# Patient Record
Sex: FEMALE | Race: WHITE | NOT HISPANIC OR LATINO | Employment: FULL TIME | URBAN - METROPOLITAN AREA
[De-identification: names, ages, dates, MRNs, and addresses within clinical notes are randomized per-mention and may not be internally consistent; named-entity substitution may affect disease eponyms.]

---

## 2017-04-11 ENCOUNTER — ALLSCRIPTS OFFICE VISIT (OUTPATIENT)
Dept: OTHER | Facility: OTHER | Age: 32
End: 2017-04-11

## 2017-04-11 DIAGNOSIS — J30.9 ALLERGIC RHINITIS: ICD-10-CM

## 2017-04-11 DIAGNOSIS — L30.9 DERMATITIS: ICD-10-CM

## 2017-04-12 ENCOUNTER — LAB CONVERSION - ENCOUNTER (OUTPATIENT)
Dept: OTHER | Facility: OTHER | Age: 32
End: 2017-04-12

## 2017-04-12 ENCOUNTER — GENERIC CONVERSION - ENCOUNTER (OUTPATIENT)
Dept: OTHER | Facility: OTHER | Age: 32
End: 2017-04-12

## 2017-04-12 LAB
ALLERGEN ALMONDS (HISTORICAL): <0.1 KU/L
ALLERGEN CASHEW (HISTORICAL): <0.1 KU/L
ALLERGEN CAT EPITHELIUM-DANDER (HISTORICAL): <0.1 KU/L
ALLERGEN HAZELNUT/FILBERT IGE (HISTORICAL): <0.1 KU/L
ALLERGEN LAMB'S QUARTER IGE (HISTORICAL): <0.1 KU/L
ALLERGEN SCALLOP (F338) IGE (HISTORICAL): <0.1 KU/L
ALLERGEN TUNA (F40) IGE (HISTORICAL): <0.1 KU/L
ALLERGEN, OAK (HISTORICAL): <0.1 KU/L
ALTERNARIA ALTERNATA (HISTORICAL): <0.1 KU/L
CLADOSPORIUM HERBARUM (HISTORICAL): <0.1 KU/L
CLASS (HISTORICAL): 0
COMMON RAGWEED (HISTORICAL): <0.1 KU/L
D. FARINAE (HISTORICAL): <0.1 KU/L
DOG DANDER (HISTORICAL): <0.1 KU/L
EGG WHITE (HISTORICAL): <0.1 KU/L
F041 SALMON (HISTORICAL): <0.1 KU/L
FISH COD (HISTORICAL): <0.1 KU/L
KENTUCKY BLUE GRASS (HISTORICAL): <0.1 KU/L
MILK, COW'S (HISTORICAL): <0.1 KU/L
PEANUT (HISTORICAL): <0.1 KU/L
SESAME SEED IGE (HISTORICAL): <0.1 KU/L
SHRIMP (HISTORICAL): <0.1 KU/L
SOYBEAN (HISTORICAL): <0.1 KU/L
TIMOTHY GRASS (HISTORICAL): <0.1 KU/L
WALNUT (HISTORICAL): <0.1 KU/L
WHEAT (HISTORICAL): <0.1 KU/L

## 2018-01-14 VITALS
TEMPERATURE: 97.8 F | WEIGHT: 127 LBS | RESPIRATION RATE: 18 BRPM | SYSTOLIC BLOOD PRESSURE: 110 MMHG | HEIGHT: 66 IN | BODY MASS INDEX: 20.41 KG/M2 | HEART RATE: 64 BPM | DIASTOLIC BLOOD PRESSURE: 80 MMHG

## 2018-01-17 NOTE — RESULT NOTES
Message  Michelle Manzanares enclosed a copy of your recent labs  Extensive allergy testing was all normal this included both inhalant allergens and food allergies   As per our conversation at your last visit you could consider a punch biopsy for persistent rash      Verified Results  (Q) ALLERGY EVALUATION 1, [facility] 11Apr2017 10:15AM Marlan Liter     Test Name Result Flag Reference   ALTERNARIA ALTERNATA (M6)$IGE <0 10 kU/L     CLASS 0     CAT DANDER (E1) IGE <0 10 kU/L     CLASS 0     CLADOSPORIUM HERBARUM$(M2) IGE <0 10 kU/L     CLASS 0     COMMON RAGWEED (SHORT)$(W1) IGE <0 10 kU/L     CLASS 0     DERMATOPHAGOIDES FARINAE$(D2) IGE <0 10 kU/L     CLASS 0     DOG DANDER (E5) IGE <0 10 kU/L     CLASS 0     VIDYA GRASS([place] BLUE)$(G8) IGE <0 10 kU/L     CLASS 0     LAMB'S QUARTERS (GOOSE$FOOT) (W10) IGE <0 10 kU/L     CLASS 0     OAK (T7) IGE <0 10 kU/L     CLASS 0     DEANN GRASS (G6) IGE <0 10 kU/L     CLASS 0       *(Q) FOOD ALLERGY PROFILE 11Apr2017 10:15AM Marlan Liter     Test Name Result Flag Reference   EGG WHITE (F1) IGE <0 10 kU/L     CLASS 0     PEANUT (F13) IGE <0 10 kU/L     CLASS 0     WHEAT (F4) IGE <0 10 kU/L     CLASS 0     WALNUT (F256) IGE <0 10 kU/L     CLASS 0     CODFISH (F3) IGE <0 10 kU/L     CLASS 0     MILK (F2) IGE <0 10 kU/L     CLASS 0     SOYBEAN (F14) IGE <0 10 kU/L     CLASS 0     SHRIMP (F24) IGE <0 10 kU/L     CLASS 0     SCALLOP (F338) IGE <0 10 kU/L     CLASS 0     SESAME SEED (F10) IGE <0 10 kU/L     CLASS 0     HAZELNUT (F17) IGE <0 10 kU/L     CLASS 0     CASHEW NUT (F202) IGE <0 10 kU/L     CLASS 0     ALMOND (F20) IGE <0 10 kU/L     CLASS 0     SALMON (F41) IGE <0 10 kU/L     CLASS 0     TUNA (F40) IGE <0 10 kU/L     CLASS 0       INTERPRETATION 11Apr2017 10:15AM Marlan Liter   REPORT COMMENT:  FASTING:NO     Test Name Result Flag Reference   INTERPRETATION See Below     Specific                        Level of Allergen  IGE Class      kU/L             Specific IGE Antibody -----         ---------        -------------------    0              <0 10           Absent/Undetectable    0/1        0 10-0 34           Very Low Level    1          0 35-0 69           Low Level    2          0 70-3 49           Moderate Level    3          3 50-17 4           High Level    4          17 5-49 9           Very High Level    5                        Very High Level    6              >100            Very High Level     The clinical relevance of allergen results of  0 10-0 34 kU/L are undetermined and intended for   specialist use  Allergens denoted with a '**' include results using  one or more analyte specific reagents  In those  cases, the test was developed and its analytical  performance characteristics have been determined by  Yani Robertson  It has not been cleared or approved  by the [place]  Food and Drug Administration  This assay   has been validated pursuant to the CLIA regulations   and is used for clinical purposes         Results/Data     (Q) ALLERGY EVALUATION 1[facility]   ALTERNARIA ALTERNATA (M6)$IGE: <0 10 kU/L  CLASS: 0   CAT DANDER (E1) IGE: <0 10 kU/L  CLASS: 0   CLADOSPORIUM HERBARUM$(M2) IGE: <0 10 kU/L  CLASS: 0   COMMON RAGWEED (SHORT)$(W1) IGE: <0 10 kU/L  CLASS: 0   DERMATOPHAGOIDES FARINAE$(D2) IGE: <0 10 kU/L  CLASS: 0   DOG DANDER (E5) IGE: <0 10 kU/L  CLASS: 0   VIDYA GRASS([place] BLUE)$(G8) IGE: <0 10 kU/L  CLASS: 0   LAMB'S QUARTERS (GOOSE$FOOT) (W10) IGE: <0 10 kU/L  CLASS: 0   OAK (T7) IGE: <0 10 kU/L  CLASS: 0   DEANN GRASS (G6) IGE: <0 10 kU/L  CLASS: 0   *(Q) FOOD ALLERGY PROFILE   EGG WHITE (F1) IGE: <0 10 kU/L  CLASS: 0   PEANUT (F13) IGE: <0 10 kU/L  CLASS: 0   WHEAT (F4) IGE: <0 10 kU/L  CLASS: 0   WALNUT (F256) IGE: <0 10 kU/L  CLASS: 0   CODFISH (F3) IGE: <0 10 kU/L  CLASS: 0   MILK (F2) IGE: <0 10 kU/L  CLASS: 0   SOYBEAN (F14) IGE: <0 10 kU/L  CLASS: 0   SHRIMP (F24) IGE: <0 10 kU/L  CLASS: 0   SCALLOP (F338) IGE: <0 10 kU/L  CLASS: 0 SESAME SEED (F10) IGE: <0 10 kU/L  CLASS: 0   HAZELNUT (F17) IGE: <0 10 kU/L  CLASS: 0   CASHEW NUT (F202) IGE: <0 10 kU/L  CLASS: 0   ALMOND (F20) IGE: <0 10 kU/L  CLASS: 0   SALMON (F41) IGE: <0 10 kU/L  CLASS: 0   TUNA (F40) IGE: <0 10 kU/L  CLASS: 0     Signatures   Electronically signed by : JANY Lowe ; Apr 12 2017  6:02PM EST                       (Author)

## 2019-01-07 ENCOUNTER — OFFICE VISIT (OUTPATIENT)
Dept: FAMILY MEDICINE CLINIC | Facility: CLINIC | Age: 34
End: 2019-01-07
Payer: COMMERCIAL

## 2019-01-07 VITALS
BODY MASS INDEX: 22.5 KG/M2 | HEART RATE: 68 BPM | HEIGHT: 66 IN | TEMPERATURE: 98.5 F | WEIGHT: 140 LBS | DIASTOLIC BLOOD PRESSURE: 78 MMHG | SYSTOLIC BLOOD PRESSURE: 108 MMHG

## 2019-01-07 DIAGNOSIS — J01.00 ACUTE NON-RECURRENT MAXILLARY SINUSITIS: Primary | ICD-10-CM

## 2019-01-07 PROCEDURE — 99213 OFFICE O/P EST LOW 20 MIN: CPT | Performed by: NURSE PRACTITIONER

## 2019-01-07 PROCEDURE — 3008F BODY MASS INDEX DOCD: CPT | Performed by: NURSE PRACTITIONER

## 2019-01-07 PROCEDURE — 1036F TOBACCO NON-USER: CPT | Performed by: NURSE PRACTITIONER

## 2019-01-07 RX ORDER — AMOXICILLIN AND CLAVULANATE POTASSIUM 875; 125 MG/1; MG/1
1 TABLET, FILM COATED ORAL EVERY 12 HOURS SCHEDULED
Qty: 20 TABLET | Refills: 0 | Status: SHIPPED | OUTPATIENT
Start: 2019-01-07 | End: 2019-01-17

## 2019-01-07 RX ORDER — GUAIFENESIN 600 MG
1200 TABLET, EXTENDED RELEASE 12 HR ORAL EVERY 12 HOURS SCHEDULED
Refills: 0
Start: 2019-01-07 | End: 2019-06-13 | Stop reason: ALTCHOICE

## 2019-01-07 NOTE — LETTER
January 7, 2019     Patient: Fortino Amor   YOB: 1985   Date of Visit: 1/7/2019       To Whom it May Concern:    Chey Jack was seen in my clinic on 1/7/2019  She may return to work on 01/10/19  If you have any questions or concerns, please don't hesitate to call           Sincerely,          JOSHUA Arenas        CC: No Recipients

## 2019-01-07 NOTE — PROGRESS NOTES
Assessment/Plan:     Diagnoses and all orders for this visit:    Acute non-recurrent maxillary sinusitis  -     amoxicillin-clavulanate (AUGMENTIN) 875-125 mg per tablet; Take 1 tablet by mouth every 12 (twelve) hours for 10 days  -     guaiFENesin (MUCINEX) 600 mg 12 hr tablet; Take 2 tablets (1,200 mg total) by mouth every 12 (twelve) hours    Discussed with patient plan to treat with 10 day course of Augmentin twice a day  Recommended patient use OTC Mucinex for nasal congestion   Patient instructed to call if no improvement in 72 hours or symptoms worsen    Subjective:      Patient ID: Pooja Chadwick is a 28 y o  female  28year old female presenting with nasal congestion for past week and developed increasing laryngitis over the past three days  She went to an urgent care center and was told her illness was viral and conservative measures suggested  She denies fever, chills or generalized body aches being associated with other symptoms  She used acetaminophen, ibuprofen, and OTC Cold and Flu Medication  She reports that the urgent care center prescribed promethazine DM for congestion but it caused her to become dizzy  No family history on file  Social History     Social History    Marital status: /Civil Union     Spouse name: N/A    Number of children: N/A    Years of education: N/A     Occupational History    Not on file  Social History Main Topics    Smoking status: Former Smoker    Smokeless tobacco: Never Used    Alcohol use Not on file    Drug use: Unknown    Sexual activity: Not on file     Other Topics Concern    Not on file     Social History Narrative    No narrative on file     No past medical history on file  No past surgical history on file    Allergies   Allergen Reactions    Pollen Extract        Current Outpatient Prescriptions:     amoxicillin-clavulanate (AUGMENTIN) 875-125 mg per tablet, Take 1 tablet by mouth every 12 (twelve) hours for 10 days, Disp: 20 tablet, Rfl: 0    guaiFENesin (MUCINEX) 600 mg 12 hr tablet, Take 2 tablets (1,200 mg total) by mouth every 12 (twelve) hours, Disp: , Rfl: 0      Review of Systems   Constitutional: Negative  HENT: Positive for congestion, postnasal drip, rhinorrhea, sore throat and voice change  Eyes: Negative  Respiratory: Positive for cough  Cardiovascular: Negative  Gastrointestinal: Negative  Musculoskeletal: Negative  Neurological: Negative  Hematological: Negative  Psychiatric/Behavioral: Negative  Objective:    /78 (BP Location: Left arm, Patient Position: Sitting, Cuff Size: Standard)   Pulse 68   Temp 98 5 °F (36 9 °C)   Ht 5' 6" (1 676 m)   Wt 63 5 kg (140 lb)   BMI 22 60 kg/m² (Reviewed)     Physical Exam   Constitutional: She is oriented to person, place, and time  Vital signs are normal  She appears well-developed and well-nourished  HENT:   Head: Normocephalic and atraumatic  Right Ear: Tympanic membrane, external ear and ear canal normal    Left Ear: Tympanic membrane, external ear and ear canal normal    Nose: Mucosal edema and rhinorrhea present  Right sinus exhibits maxillary sinus tenderness  Left sinus exhibits maxillary sinus tenderness  Mouth/Throat: Uvula is midline and mucous membranes are normal  Oropharyngeal exudate and posterior oropharyngeal erythema present  Positive increased sinus pressure with forward head tilt   Eyes: Pupils are equal, round, and reactive to light  Conjunctivae, EOM and lids are normal    Neck: Trachea normal and normal range of motion  Cardiovascular: Normal rate, regular rhythm and normal heart sounds  Pulmonary/Chest: Effort normal and breath sounds normal    Lymphadenopathy:     She has no cervical adenopathy  Neurological: She is alert and oriented to person, place, and time  Skin: Skin is warm and dry  Psychiatric: She has a normal mood and affect   Her behavior is normal

## 2019-01-08 ENCOUNTER — TELEPHONE (OUTPATIENT)
Dept: FAMILY MEDICINE CLINIC | Facility: CLINIC | Age: 34
End: 2019-01-08

## 2019-01-08 DIAGNOSIS — J01.00 ACUTE NON-RECURRENT MAXILLARY SINUSITIS: Primary | ICD-10-CM

## 2019-01-08 RX ORDER — AZITHROMYCIN 250 MG/1
TABLET, FILM COATED ORAL
Qty: 6 TABLET | Refills: 0 | Status: SHIPPED | OUTPATIENT
Start: 2019-01-08 | End: 2019-01-12

## 2019-01-08 NOTE — TELEPHONE ENCOUNTER
Pt was seen yesterday for sinusitis and was prescribed amoxicillin but the tablets are too big for her to swallow because her throat is very sore  She is asking to be switched to a zpak because she has had them before and knows the pills are smaller   Can leave message on pts phone

## 2019-06-13 ENCOUNTER — OFFICE VISIT (OUTPATIENT)
Dept: FAMILY MEDICINE CLINIC | Facility: CLINIC | Age: 34
End: 2019-06-13
Payer: COMMERCIAL

## 2019-06-13 VITALS
HEART RATE: 118 BPM | DIASTOLIC BLOOD PRESSURE: 64 MMHG | HEIGHT: 66 IN | OXYGEN SATURATION: 98 % | TEMPERATURE: 98.2 F | SYSTOLIC BLOOD PRESSURE: 88 MMHG | BODY MASS INDEX: 21.13 KG/M2 | RESPIRATION RATE: 17 BRPM | WEIGHT: 131.5 LBS

## 2019-06-13 DIAGNOSIS — J00 ACUTE NASOPHARYNGITIS: Primary | ICD-10-CM

## 2019-06-13 DIAGNOSIS — J35.8 TONSILLITH: ICD-10-CM

## 2019-06-13 PROBLEM — J30.9 ALLERGIC RHINITIS: Status: ACTIVE | Noted: 2017-04-11

## 2019-06-13 PROBLEM — L30.9 ECZEMA: Status: ACTIVE | Noted: 2017-04-11

## 2019-06-13 PROCEDURE — 1036F TOBACCO NON-USER: CPT | Performed by: PHYSICIAN ASSISTANT

## 2019-06-13 PROCEDURE — 3008F BODY MASS INDEX DOCD: CPT | Performed by: PHYSICIAN ASSISTANT

## 2019-06-13 PROCEDURE — 99213 OFFICE O/P EST LOW 20 MIN: CPT | Performed by: PHYSICIAN ASSISTANT

## 2019-11-14 ENCOUNTER — OFFICE VISIT (OUTPATIENT)
Dept: FAMILY MEDICINE CLINIC | Facility: CLINIC | Age: 34
End: 2019-11-14
Payer: COMMERCIAL

## 2019-11-14 VITALS
RESPIRATION RATE: 16 BRPM | SYSTOLIC BLOOD PRESSURE: 110 MMHG | HEIGHT: 66 IN | OXYGEN SATURATION: 98 % | HEART RATE: 96 BPM | DIASTOLIC BLOOD PRESSURE: 64 MMHG | WEIGHT: 136 LBS | TEMPERATURE: 98 F | BODY MASS INDEX: 21.86 KG/M2

## 2019-11-14 DIAGNOSIS — J06.9 ACUTE URI: Primary | ICD-10-CM

## 2019-11-14 PROCEDURE — 99213 OFFICE O/P EST LOW 20 MIN: CPT | Performed by: FAMILY MEDICINE

## 2019-11-14 PROCEDURE — 1036F TOBACCO NON-USER: CPT | Performed by: FAMILY MEDICINE

## 2019-11-14 RX ORDER — LEVOTHYROXINE SODIUM 0.03 MG/1
25 TABLET ORAL DAILY
Refills: 1 | COMMUNITY
Start: 2019-10-23

## 2019-11-14 NOTE — PROGRESS NOTES
Assessment/Plan:     Diagnoses and all orders for this visit:    Acute URI    Other orders  -     levothyroxine 25 mcg tablet; Take 25 mcg by mouth daily  -     Prenatal Multivit-Min-Fe-FA (PRE- FORMULA) TABS; Take 1 tablet by mouth daily        Continue with prn Tylenol and Benadryl  Add Flonase nasal spray daily with saline nasal spray  Stay hydrated  Advised to call if any changes  I recommended a flu vaccine at a later date  Patient ID: Brad Schofield is a 27 y o  female  2 week history of persistent cold symptoms  + nasal congestion with post nasal drainage  Intermittent cough at times productive of clear to green phlegm  13 weeks pregnant  OB suggested prn Tylenol and Benadryl  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  No flu vaccine to date  The following portions of the patient's history were reviewed and updated as appropriate: allergies, current medications, past family history, past medical history, past social history, past surgical history and problem list     Review of Systems   Constitutional: Positive for fatigue  Negative for appetite change, chills and fever  HENT: Positive for congestion, postnasal drip, rhinorrhea and voice change (hoarse )  Negative for ear pain, sinus pain and sore throat  See HPI   Respiratory: Negative for shortness of breath and wheezing  See HPI   Cardiovascular: Negative for chest pain  Gastrointestinal: Negative for diarrhea, nausea and vomiting  Musculoskeletal: Negative for myalgias  Neurological: Negative for headaches  Hematological: Negative for adenopathy  Objective:      /64   Pulse 96   Temp 98 °F (36 7 °C)   Resp 16   Ht 5' 6" (1 676 m)   Wt 61 7 kg (136 lb)   LMP 2019   SpO2 98%   BMI 21 95 kg/m²          Physical Exam   Constitutional: She appears well-developed and well-nourished  No distress     HENT:   Right Ear: Tympanic membrane normal    Left Ear: Tympanic membrane normal    Nose: Right sinus exhibits no maxillary sinus tenderness and no frontal sinus tenderness  Left sinus exhibits no maxillary sinus tenderness and no frontal sinus tenderness  Mouth/Throat: Uvula is midline, oropharynx is clear and moist and mucous membranes are normal  No oral lesions  No posterior oropharyngeal erythema  Eyes: Conjunctivae are normal  No scleral icterus  Neck: No tracheal deviation present  No thyromegaly present  Cardiovascular: Normal rate, regular rhythm and normal heart sounds  Exam reveals no gallop  No murmur heard  Pulmonary/Chest: Effort normal and breath sounds normal  No respiratory distress  She has no wheezes  She has no rales  Lymphadenopathy:     She has no cervical adenopathy  Skin: No rash noted  Nursing note and vitals reviewed

## 2019-11-19 ENCOUNTER — TELEPHONE (OUTPATIENT)
Dept: FAMILY MEDICINE CLINIC | Facility: CLINIC | Age: 34
End: 2019-11-19

## 2019-11-19 DIAGNOSIS — J06.9 ACUTE URI: Primary | ICD-10-CM

## 2019-11-19 RX ORDER — AZITHROMYCIN 250 MG/1
TABLET, FILM COATED ORAL
Qty: 6 TABLET | Refills: 0 | Status: SHIPPED | OUTPATIENT
Start: 2019-11-19 | End: 2019-11-23

## 2019-11-19 NOTE — TELEPHONE ENCOUNTER
Patient was seen on 11/14/19 for Acute URI  She has not gotten any better  You had discussed an antibiotic but she is going into second trimester of her pregnancy  Please advise  [facility]      Leonardo Doan [phone]

## 2019-11-19 NOTE — TELEPHONE ENCOUNTER
Spoke with Dr Silver Brine  He is aware of her second trimester of pregnancy & feels the zpack is the best choice and it is a shorter dose of medication  Patient notified

## 2019-11-19 NOTE — TELEPHONE ENCOUNTER
Patient called and I gave message  She is asking if Penicillin would be a better option then the [address]  for her since she is pregnant? She is just concerned  Please advise

## 2020-01-24 ENCOUNTER — OFFICE VISIT (OUTPATIENT)
Dept: FAMILY MEDICINE CLINIC | Facility: CLINIC | Age: 35
End: 2020-01-24
Payer: COMMERCIAL

## 2020-01-24 VITALS
BODY MASS INDEX: 23.57 KG/M2 | DIASTOLIC BLOOD PRESSURE: 64 MMHG | SYSTOLIC BLOOD PRESSURE: 96 MMHG | WEIGHT: 146 LBS | RESPIRATION RATE: 16 BRPM | OXYGEN SATURATION: 99 % | HEART RATE: 95 BPM | TEMPERATURE: 96.5 F

## 2020-01-24 DIAGNOSIS — E03.9 HYPOTHYROID IN PREGNANCY, ANTEPARTUM: ICD-10-CM

## 2020-01-24 DIAGNOSIS — O99.280 HYPOTHYROID IN PREGNANCY, ANTEPARTUM: ICD-10-CM

## 2020-01-24 DIAGNOSIS — Z3A.21 21 WEEKS GESTATION OF PREGNANCY: ICD-10-CM

## 2020-01-24 DIAGNOSIS — G93.3 POST-INFLUENZA SYNDROME: Primary | ICD-10-CM

## 2020-01-24 PROBLEM — N92.6 IRREGULAR PERIODS/MENSTRUAL CYCLES: Status: ACTIVE | Noted: 2018-07-06

## 2020-01-24 PROBLEM — Z98.891 HISTORY OF CESAREAN DELIVERY: Status: ACTIVE | Noted: 2019-10-30

## 2020-01-24 PROBLEM — Z87.51 HISTORY OF PRETERM DELIVERY: Status: ACTIVE | Noted: 2019-10-30

## 2020-01-24 PROCEDURE — 1036F TOBACCO NON-USER: CPT | Performed by: PHYSICIAN ASSISTANT

## 2020-01-24 PROCEDURE — 99213 OFFICE O/P EST LOW 20 MIN: CPT | Performed by: PHYSICIAN ASSISTANT

## 2020-01-24 NOTE — PROGRESS NOTES
Assessment/Plan:     Diagnoses and all orders for this visit:    Post-influenza syndrome  21 weeks gestation of pregnancy  Lungs clear, Afebrile  Discussed prolonged recovery complicated by pregnancy  She was seen by Óscar Khalil yesterday for 21w followup with no concern  No evidence of current infection  Encouraged patient to continue with fluid intake  - Discussed use of Tylenol during pregnancy  Discussed new laboratory findings regarding possible connection of Tylenol with ADHD development   - Directed to FU with worsening symptoms or fever    Hypothyroidism in pregnancy, antepartum  Managed on Levothyroxine 25 mcg  Managed by Gyn        Subjective:    Patient ID: Orestes Del Castillo is a 27 y o  female  Pt is presenting today for lingering dry cough, PND, nasal congestion and lethargy for the past week  7 days ago she was seen at Urgent Care and tested positive for influenza B strain  She was too late for Tamiflu but was put on ZPak  She is 56S1D uncomplicated pregnant  She was diagnosed with hypothyroidism in pregnancy and started on levothyroxine  She has significantly improved over the past week  She wanted to discuss recent study associating Tylenol during pregnancy and development of ADHD / autism  The following portions of the patient's history were reviewed and updated as appropriate: allergies, current medications and problem list     Review of Systems   Constitutional: Positive for fatigue  Negative for fever and unexpected weight change  HENT: Positive for rhinorrhea  Negative for sore throat  Respiratory: Positive for cough  Negative for shortness of breath and wheezing  Cardiovascular: Negative for chest pain, palpitations and leg swelling  Gastrointestinal: Negative for constipation, diarrhea and nausea  Musculoskeletal: Negative for arthralgias and myalgias           Objective:  BP 96/64   Pulse 95   Temp (!) 96 5 °F (35 8 °C)   Resp 16   Wt 66 2 kg (146 lb)   LMP 05/13/2019   SpO2 99%   BMI 23 57 kg/m²      Physical Exam   Constitutional: She is oriented to person, place, and time  She appears well-developed and well-nourished  No distress  HENT:   Head: Normocephalic and atraumatic  Right Ear: Tympanic membrane, external ear and ear canal normal    Left Ear: Tympanic membrane, external ear and ear canal normal    Mouth/Throat: Oropharynx is clear and moist  No oropharyngeal exudate or posterior oropharyngeal erythema  Eyes: Pupils are equal, round, and reactive to light  Neck: Normal range of motion  Neck supple  Cardiovascular: Normal rate, regular rhythm, normal heart sounds and intact distal pulses  Exam reveals no gallop and no friction rub  No murmur heard  Pulmonary/Chest: Effort normal and breath sounds normal  No respiratory distress  She has no wheezes  She has no rales  Lymphadenopathy:     She has no cervical adenopathy  Neurological: She is alert and oriented to person, place, and time  Skin: She is not diaphoretic  Psychiatric: She has a normal mood and affect  Her behavior is normal  Thought content normal    Vitals reviewed

## 2020-02-06 ENCOUNTER — TELEPHONE (OUTPATIENT)
Dept: FAMILY MEDICINE CLINIC | Facility: CLINIC | Age: 35
End: 2020-02-06

## 2020-02-06 NOTE — TELEPHONE ENCOUNTER
Spoke with patient  Her employer has a policy in place where if a person misses more than 5 days of work, they need to file for disability  Patient had the flu which started on 1/16/2020  On 1/18/2020 she went to urgent care  After still not feeling better, she came to our office on 1/24/2020  She missed work from the 16th through the 24th

## 2021-11-05 ENCOUNTER — TELEPHONE (OUTPATIENT)
Dept: FAMILY MEDICINE CLINIC | Facility: CLINIC | Age: 36
End: 2021-11-05

## 2021-11-09 ENCOUNTER — TELEMEDICINE (OUTPATIENT)
Dept: FAMILY MEDICINE CLINIC | Facility: CLINIC | Age: 36
End: 2021-11-09

## 2021-11-09 ENCOUNTER — TELEPHONE (OUTPATIENT)
Dept: FAMILY MEDICINE CLINIC | Facility: CLINIC | Age: 36
End: 2021-11-09

## 2021-11-09 DIAGNOSIS — B34.9 VIRAL INFECTION, UNSPECIFIED: Primary | ICD-10-CM

## 2021-11-09 PROCEDURE — 99442 PR PHYS/QHP TELEPHONE EVALUATION 11-20 MIN: CPT | Performed by: PHYSICIAN ASSISTANT

## 2021-11-09 PROCEDURE — U0003 INFECTIOUS AGENT DETECTION BY NUCLEIC ACID (DNA OR RNA); SEVERE ACUTE RESPIRATORY SYNDROME CORONAVIRUS 2 (SARS-COV-2) (CORONAVIRUS DISEASE [COVID-19]), AMPLIFIED PROBE TECHNIQUE, MAKING USE OF HIGH THROUGHPUT TECHNOLOGIES AS DESCRIBED BY CMS-2020-01-R: HCPCS | Performed by: PHYSICIAN ASSISTANT

## 2021-11-09 PROCEDURE — U0005 INFEC AGEN DETEC AMPLI PROBE: HCPCS | Performed by: PHYSICIAN ASSISTANT

## 2021-11-09 RX ORDER — BENZONATATE 200 MG/1
200 CAPSULE ORAL 3 TIMES DAILY PRN
Qty: 20 CAPSULE | Refills: 0 | Status: SHIPPED | OUTPATIENT
Start: 2021-11-09 | End: 2022-08-03 | Stop reason: SDUPTHER

## 2021-11-10 ENCOUNTER — PATIENT MESSAGE (OUTPATIENT)
Dept: FAMILY MEDICINE CLINIC | Facility: CLINIC | Age: 36
End: 2021-11-10

## 2021-11-10 ENCOUNTER — TELEPHONE (OUTPATIENT)
Dept: FAMILY MEDICINE CLINIC | Facility: CLINIC | Age: 36
End: 2021-11-10

## 2021-11-10 LAB — SARS-COV-2 RNA RESP QL NAA+PROBE: NEGATIVE

## 2022-08-03 ENCOUNTER — TELEPHONE (OUTPATIENT)
Dept: FAMILY MEDICINE CLINIC | Facility: CLINIC | Age: 37
End: 2022-08-03

## 2022-08-03 ENCOUNTER — TELEMEDICINE (OUTPATIENT)
Dept: FAMILY MEDICINE CLINIC | Facility: CLINIC | Age: 37
End: 2022-08-03
Payer: COMMERCIAL

## 2022-08-03 DIAGNOSIS — U07.1 COVID-19: Primary | ICD-10-CM

## 2022-08-03 DIAGNOSIS — B34.9 VIRAL INFECTION, UNSPECIFIED: ICD-10-CM

## 2022-08-03 PROCEDURE — 99213 OFFICE O/P EST LOW 20 MIN: CPT | Performed by: FAMILY MEDICINE

## 2022-08-03 RX ORDER — BENZONATATE 200 MG/1
200 CAPSULE ORAL 3 TIMES DAILY PRN
Qty: 20 CAPSULE | Refills: 0 | Status: SHIPPED | OUTPATIENT
Start: 2022-08-03

## 2022-08-03 NOTE — PROGRESS NOTES
COVID-19 Outpatient Progress Note    Assessment/Plan:    Problem List Items Addressed This Visit    None     Visit Diagnoses     COVID-19    -  Primary    Relevant Medications    nirmatrelvir & ritonavir (Paxlovid) tablet therapy pack    Viral infection, unspecified        Relevant Medications    nirmatrelvir & ritonavir (Paxlovid) tablet therapy pack    benzonatate (TESSALON) 200 MG capsule         Disposition:     Patient is fully vaccinated and I recommended self quarantine for 5 days followed by strict mask use for an additional 5 days  If patient were to develop symptoms, they should immediately self isolate and call our office for further guidance  Discussed symptom directed medication options with patient  Discussed vitamin D, vitamin C, and/or zinc supplementation with patient  Patient meets criteria for PAXLOVID and they have been counseled appropriately according to EUA documentation released by the FDA  After discussion, patient agrees to treatment  Myranda Cristhian is an investigational medicine used to treat mild-to-moderate COVID-19 in adults and children (15years of age and older weighing at least 80 pounds (40 kg)) with positive results of direct SARS-CoV-2 viral testing, and who are at high risk for progression to severe COVID-19, including hospitalization or death  PAXLOVID is investigational because it is still being studied  There is limited information about the safety and effectiveness of using PAXLOVID to treat people with mild-to-moderate COVID-19  The FDA has authorized the emergency use of PAXLOVID for the treatment of mild-tomoderate COVID-19 in adults and children (15years of age and older weighing at least 80 pounds (40 kg)) with a positive test for the virus that causes COVID-19, and who are at high risk for progression to severe COVID-19, including hospitalization or death, under an EUA  What should I tell my healthcare provider before I take PAXLOVID?     Tell your healthcare provider if you:  - Have any allergies  - Have liver or kidney disease  - Are pregnant or plan to become pregnant  - Are breastfeeding a child  - Have any serious illnesses    Tell your healthcare provider about all the medicines you take, including prescription and over-the-counter medicines, vitamins, and herbal supplements  Some medicines may interact with PAXLOVID and may cause serious side effects  Keep a list of your medicines to show your healthcare provider and pharmacist when you get a new medicine  You can ask your healthcare provider or pharmacist for a list of medicines that interact with PAXLOVID  Do not start taking a new medicine without telling your healthcare provider  Your healthcare provider can tell you if it is safe to take PAXLOVID with other medicines  Tell your healthcare provider if you are taking combined hormonal contraceptive  PAXLOVID may affect how your birth control pills work  Females who are able to become pregnant should use another effective alternative form of contraception or an additional barrier method of contraception  Talk to your healthcare provider if you have any questions about contraceptive methods that might be right for you  How do I take PAXLOVID? PAXLOVID consists of 2 medicines: nirmatrelvir and ritonavir  - Take 2 pink tablets of nirmatrelvir with 1 white tablet of ritonavir by mouth 2 times each day (in the morning and in the evening) for 5 days  For each dose, take all 3 tablets at the same time  - If you have kidney disease, talk to your healthcare provider  You may need a different dose  - Swallow the tablets whole  Do not chew, break, or crush the tablets  - Take PAXLOVID with or without food  - Do not stop taking PAXLOVID without talking to your healthcare provider, even if you feel better  - If you miss a dose of PAXLOVID within 8 hours of the time it is usually taken, take it as soon as you remember   If you miss a dose by more than 8 hours, skip the missed dose and take the next dose at your regular time  Do not take 2 doses of PAXLOVID at the same time  - If you take too much PAXLOVID, call your healthcare provider or go to the nearest hospital emergency room right away  - If you are taking a ritonavir- or cobicistat-containing medicine to treat hepatitis C or Human Immunodeficiency Virus (HIV), you should continue to take your medicine as prescribed by your healthcare provider   - Talk to your healthcare provider if you do not feel better or if you feel worse after 5 days  Who should generally not take PAXLOVID? Do not take PAXLOVID if:  You are allergic to nirmatrelvir, ritonavir, or any of the ingredients in PAXLOVID  You are taking any of the following medicines:  - Alfuzosin  - Pethidine, piroxicam, propoxyphene  - Ranolazine  - Amiodarone, dronedarone, flecainide, propafenone, quinidine  - Colchicine  - Lurasidone, pimozide, clozapine  - Dihydroergotamine, ergotamine, methylergonovine  - Lovastatin, simvastatin  - Sildenafil (Revatio®) for pulmonary arterial hypertension (PAH)  - Triazolam, oral midazolam  - Apalutamide  - Carbamazepine, phenobarbital, phenytoin  - Rifampin  - St  Ramiros Wort (hypericum perforatum)    What are the important possible side effects of PAXLOVID? Possible side effects of PAXLOVID are:  - Liver Problems  Tell your healthcare provider right away if you have any of these signs and symptoms of liver problems: loss of appetite, yellowing of your skin and the whites of eyes (jaundice), dark-colored urine, pale colored stools and itchy skin, stomach area (abdominal) pain  - Resistance to HIV Medicines  If you have untreated HIV infection, PAXLOVID may lead to some HIV medicines not working as well in the future  - Other possible side effects include: altered sense of taste, diarrhea, high blood pressure, or muscle aches    These are not all the possible side effects of PAXLOVID   Not many people have taken PAXLOVID  Serious and unexpected side effects may happen  Laveda Hait is still being studied, so it is possible that all of the risks are not known at this time  What other treatment choices are there? Like Melanie Giraldoing may allow for the emergency use of other medicines to treat people with COVID-19  Go to https://[employer]/ for information on the emergency use of other medicines that are authorized by FDA to treat people with COVID-19  Your healthcare provider may talk with you about clinical trials for which you may be eligible  It is your choice to be treated or not to be treated with PAXLOVID  Should you decide not to receive it or for your child not to receive it, it will not change your standard medical care  What if I am pregnant or breastfeeding? There is no experience treating pregnant women or breastfeeding mothers with PAXLOVID  For a mother and unborn baby, the benefit of taking PAXLOVID may be greater than the risk from the treatment  If you are pregnant, discuss your options and specific situation with your healthcare provider  It is recommended that you use effective barrier contraception or do not have sexual activity while taking PAXLOVID  If you are breastfeeding, discuss your options and specific situation with your healthcare provider  How do I report side effects with PAXLOVID? Contact your healthcare provider if you have any side effects that bother you or do not go away  Report side effects to FDA MedWatch at www fda gov/medwatch or call 3-819-PHQ2732 or you can report side effects to [facility]  at the contact information provided below  Website Fax number Telephone number   [employer] [phone]     How should I store Kristopher Overton?     Store PAXLOVID tablets at room temperature between 68°F to 77°F (20°C to 25°C)     Full fact sheet for patients, parents, and caregivers can be found at: Emily eldridge    I have spent 15 minutes directly with the patient  Greater than 50% of this time was spent in counseling/coordination of care regarding: prognosis, instructions for management and impressions  Encounter provider Graham Sagastume MD    Provider located at [place] [address]  [address] [address]  [phone]    Recent Visits  No visits were found meeting these conditions  Showing recent visits within past 7 days and meeting all other requirements  Today's Visits  Date Type Provider Dept   08/03/22 Telephone [id number]   08/03/22 Telemedicine Graham Sagastume MD [facility]    Showing today's visits and meeting all other requirements  Future Appointments  No visits were found meeting these conditions  Showing future appointments within next 150 days and meeting all other requirements     This virtual check-in was done via [facility] and patient was informed that this is a secure, HIPAA-compliant platform  She agrees to proceed  Patient agrees to participate in a virtual check in via telephone or video visit instead of presenting to the office to address urgent/immediate medical needs  Patient is aware this is a billable service  After connecting through [facility], the patient was identified by name and date of birth  Kathie Lobato was informed that this was a telemedicine visit and that the exam was being conducted confidentially over secure lines  Junie Kumar acknowledged consent and understanding of privacy and security of the telemedicine visit  I informed the patient that I have reviewed her record in Epic and presented the opportunity for her to ask any questions regarding the visit today  The patient agreed to participate      Verification of patient location:  Patient is located in the following state in which I hold an active license: [place]    Subjective:   Papito Lawton is a 39 y o  female who is concerned about COVID-19  Patient's symptoms include fever, chills, fatigue, nasal congestion, rhinorrhea, cough, myalgias and headache  Patient denies shortness of breath, abdominal pain, nausea, vomiting and diarrhea  - Date of symptom onset: 2022      COVID-19 vaccination status: Fully vaccinated (primary series)    Exposure:   Contact with a person who is under investigation (PUI) for or who is positive for COVID-19 within the last 14 days?: Yes    Hospitalized recently for fever and/or lower respiratory symptoms?: No      Currently a healthcare worker that is involved in direct patient care?: No      Works in a special setting where the risk of COVID-19 transmission may be high? (this may include long-term care, correctional and prison facilities; homeless shelters; assisted-living facilities and group homes ): No      Resident in a special setting where the risk of COVID-19 transmission may be high? (this may include long-term care, correctional and prison facilities; homeless shelters; assisted-living facilities and group homes ): No      Lab Results   Component Value Date    SARSCOV2 Negative 2021     No past medical history on file  Past Surgical History:   Procedure Laterality Date    NO PAST SURGERIES       Current Outpatient Medications   Medication Sig Dispense Refill    benzonatate (TESSALON) 200 MG capsule Take 1 capsule (200 mg total) by mouth 3 (three) times a day as needed for cough 20 capsule 0    levothyroxine 25 mcg tablet Take 25 mcg by mouth daily  1    Prenatal Multivit-Min-Fe-FA (PRE-MARCOS FORMULA) TABS Take 1 tablet by mouth daily       No current facility-administered medications for this visit  Allergies   Allergen Reactions    Pollen Extract Allergic Rhinitis       Review of Systems   Constitutional: Positive for chills, fatigue and fever  HENT: Positive for congestion and rhinorrhea  Respiratory: Positive for cough  Negative for shortness of breath  Gastrointestinal: Negative for abdominal pain, diarrhea, nausea and vomiting  Musculoskeletal: Positive for myalgias  Neurological: Positive for headaches  Objective: There were no vitals filed for this visit  Physical Exam  Constitutional:       General: She is not in acute distress  Appearance: Normal appearance  HENT:      Head: Normocephalic and atraumatic  Pulmonary:      Effort: No respiratory distress  Neurological:      General: No focal deficit present  Mental Status: She is alert  Psychiatric:         Mood and Affect: Mood normal          VIRTUAL VISIT DISCLAIMER    Julia Kumar verbally agrees to participate in [employer]  Pt is aware that [employer] could be limited without vital signs or the ability to perform a full hands-on physical exam  Julia Lopez Early understands she or the provider may request at any time to terminate the video visit and request the patient to seek care or treatment in person

## 2022-08-08 ENCOUNTER — TELEPHONE (OUTPATIENT)
Dept: FAMILY MEDICINE CLINIC | Facility: CLINIC | Age: 37
End: 2022-08-08

## 2022-08-08 NOTE — TELEPHONE ENCOUNTER
Call she could be experiencing side effects from the medications   She could stop both medications and try Mucinex DM for cough instead of Tessalon

## 2022-08-08 NOTE — TELEPHONE ENCOUNTER
Patient is still recovering from Covid  She has been taking paxlovid & tessalon pearls for cough  She is not improving & today is feeling very dizzy & lightheaded  Wants to know if this could be from medication? She is sending a form to be completed for her employer to take time off for sickness

## 2023-01-31 ENCOUNTER — TELEPHONE (OUTPATIENT)
Dept: FAMILY MEDICINE CLINIC | Facility: CLINIC | Age: 38
End: 2023-01-31

## 2023-01-31 NOTE — TELEPHONE ENCOUNTER
Verbal conversation with Dr Jerome No who suggests a visit tomorrow if she is still feeling poorly  Left message for patient to call with her decision

## 2023-01-31 NOTE — TELEPHONE ENCOUNTER
Pt having urgent diarrhea since last Thursday  Has not eaten much since  She is drinking Pedalyte and water  Was having low grade fever with aches and chills on Sunday but that has subsided  Very fatigued at this point due to diarrhea  Dr Terese Ross has  200 availability today  if necessary      Please advise